# Patient Record
Sex: FEMALE | Race: WHITE | NOT HISPANIC OR LATINO | Employment: STUDENT | URBAN - METROPOLITAN AREA
[De-identification: names, ages, dates, MRNs, and addresses within clinical notes are randomized per-mention and may not be internally consistent; named-entity substitution may affect disease eponyms.]

---

## 2018-05-14 ENCOUNTER — RECORDS - HEALTHEAST (OUTPATIENT)
Dept: LAB | Facility: CLINIC | Age: 18
End: 2018-05-14

## 2018-05-14 LAB
T4 FREE SERPL-MCNC: 1 NG/DL (ref 0.7–1.8)
TSH SERPL DL<=0.005 MIU/L-ACNC: 0.82 UIU/ML (ref 0.3–5)

## 2018-05-15 LAB
25(OH)D3 SERPL-MCNC: 32.1 NG/ML (ref 30–80)
C TRACH DNA SPEC QL PROBE+SIG AMP: NEGATIVE
N GONORRHOEA DNA SPEC QL NAA+PROBE: NEGATIVE

## 2021-10-12 ENCOUNTER — LAB REQUISITION (OUTPATIENT)
Dept: LAB | Facility: CLINIC | Age: 21
End: 2021-10-12

## 2021-10-12 DIAGNOSIS — R06.02 SHORTNESS OF BREATH: ICD-10-CM

## 2021-10-12 LAB
T3FREE SERPL-MCNC: 3 PG/ML (ref 1.6–3.9)
T4 FREE SERPL-MCNC: 0.89 NG/DL (ref 0.7–1.8)
TSH SERPL DL<=0.005 MIU/L-ACNC: 0.87 UIU/ML (ref 0.3–5)

## 2021-10-12 PROCEDURE — 84439 ASSAY OF FREE THYROXINE: CPT | Performed by: FAMILY MEDICINE

## 2021-10-12 PROCEDURE — 86003 ALLG SPEC IGE CRUDE XTRC EA: CPT | Performed by: FAMILY MEDICINE

## 2021-10-12 PROCEDURE — 84443 ASSAY THYROID STIM HORMONE: CPT | Performed by: FAMILY MEDICINE

## 2021-10-12 PROCEDURE — 84481 FREE ASSAY (FT-3): CPT | Performed by: FAMILY MEDICINE

## 2021-10-14 LAB
A ALTERNATA IGE QN: <0.1 KU(A)/L
A FUMIGATUS IGE QN: <0.1 KU(A)/L
ALMOND IGE QN: <0.1 KU(A)/L
BERMUDA GRASS IGE QN: <0.1 KU(A)/L
C HERBARUM IGE QN: <0.1 KU(A)/L
CASHEW NUT IGE QN: <0.1 KU(A)/L
CAT DANDER IGG QN: <0.1 KU(A)/L
CEDAR IGE QN: <0.1 KU(A)/L
CODFISH IGE QN: <0.1 KU(A)/L
COMMON RAGWEED IGE QN: <0.1 KU(A)/L
COTTONWOOD IGE QN: <0.1 KU(A)/L
COW MILK IGE QN: <0.1 KU(A)/L
D FARINAE IGE QN: <0.1 KU(A)/L
D PTERONYSS IGE QN: 0.11 KU(A)/L
DOG DANDER+EPITH IGE QN: <0.1 KU(A)/L
EGG WHITE IGE QN: <0.1 KU(A)/L
HAZELNUT IGE QN: <0.1 KU(A)/L
IGE SERPL-ACNC: 14 KU/L (ref 0–114)
IGE SERPL-ACNC: 14 KU/L (ref 0–114)
MAPLE IGE QN: <0.1 KU(A)/L
MARSH ELDER IGE QN: <0.1 KU(A)/L
MOUSE URINE PROT IGE QN: <0.1 KU(A)/L
NETTLE IGE QN: <0.1 KU(A)/L
P NOTATUM IGE QN: <0.1 KU(A)/L
PEANUT IGE QN: <0.1 KU(A)/L
ROACH IGE QN: <0.1 KU(A)/L
SALMON IGE QN: <0.1 KU(A)/L
SALTWORT IGE QN: <0.1 KU(A)/L
SCALLOP IGE QN: <0.1 KU(A)/L
SESAME SEED IGE QN: <0.1 KU(A)/L
SHRIMP IGE QN: <0.1 KU(A)/L
SILVER BIRCH IGE QN: <0.1 KU(A)/L
SOYBEAN IGE QN: <0.1 KU(A)/L
TIMOTHY IGE QN: <0.1 KU(A)/L
TUNA IGE QN: <0.1 KU(A)/L
WALNUT IGE QN: <0.1 KU(A)/L
WHEAT IGE QN: <0.1 KU(A)/L
WHITE ASH IGE QN: <0.1 KU(A)/L
WHITE ELM IGE QN: <0.1 KU(A)/L
WHITE MULBERRY IGE QN: <0.1 KU(A)/L
WHITE OAK IGE QN: <0.1 KU(A)/L

## 2021-11-30 ENCOUNTER — PATIENT OUTREACH (OUTPATIENT)
Dept: CARE COORDINATION | Facility: CLINIC | Age: 21
End: 2021-11-30

## 2021-11-30 DIAGNOSIS — Z65.9 PSYCHOSOCIAL PROBLEM: Primary | ICD-10-CM

## 2021-11-30 NOTE — PROGRESS NOTES
Clinic Care Coordination Contact  [facility]/Voicemail       Clinical Data: Care Coordinator Outreach  Outreach attempted x 1.  Left message on patient's voicemail with call back information and requested return call.  Plan:  Care Coordinator will try to reach patient again in 1-2 business days.    Danielle Rendon [facility]  Clinic Care Coordinator  [facility]   [phone]

## 2021-12-20 ENCOUNTER — PATIENT OUTREACH (OUTPATIENT)
Dept: CARE COORDINATION | Facility: CLINIC | Age: 21
End: 2021-12-20

## 2021-12-20 NOTE — PROGRESS NOTES
Clinic Care Coordination Contact  [facility]/Voicemail       Clinical Data: Care Coordinator Outreach  Outreach attempted x 2.  Left message on patient's voicemail with call back information and requested return call.  Plan: Care Coordinator will try to reach patient again in 3-5 business days.    Danielle Rendon [facility]  Clinic Care Coordinator  [facility]   [phone]

## 2022-01-19 ENCOUNTER — PATIENT OUTREACH (OUTPATIENT)
Dept: CARE COORDINATION | Facility: CLINIC | Age: 22
End: 2022-01-19

## 2022-01-19 NOTE — PROGRESS NOTES
Clinic Care Coordination Contact  [facility]/Voicemail       Clinical Data: Care Coordinator Outreach  Outreach attempted x 2.  Left message on patient's voicemail with call back information and requested return call.  Plan: Care Coordinator will send care coordination introduction letter with care coordinator contact information and explanation of care coordination services via mail. Care Coordinator will try to reach patient again in 1 month.    MARLENI Nick  Social Work Care Coordinator - [facility]  Care Coordination  [email or web address]  Cell Phone: [phone]  Gender pronouns: she/her  Employed by [employer]

## 2022-01-19 NOTE — LETTER
[facility] CARE COORDINATION    January 19, 2022    Juana [place]  [address] [place] [address]      Dear Juana,    I am a clinic care coordinator who works with Pavel Land MD at [facility]. I have been trying to reach you recently to introduce Clinic Care Coordination and to see if there was anything I could assist you with.  Below is a description of clinic care coordination and how I can further assist you.      The clinic care coordination team is made up of a team of care members and  who understand the health care system. The goal of clinic care coordination is to help you manage your health and improve access to the health care system in the most efficient manner. The team can assist you in meeting your health care goals by providing education, coordinating services, strengthening the communication among your providers and supporting you with any resource needs.    Please feel free to contact me at [phone] with any questions or concerns. We are focused on providing you with the highest-quality healthcare experience possible and that all starts with you.     Sincerely,     MARLENI Nick  Social Work Care Coordinator - [facility]  Care Coordination  [email or web address]  Cell Phone: [phone]  Gender pronouns: she/her  Employed by [employer]

## 2022-03-18 ENCOUNTER — PATIENT OUTREACH (OUTPATIENT)
Dept: CARE COORDINATION | Facility: CLINIC | Age: 22
End: 2022-03-18

## 2022-03-18 NOTE — PROGRESS NOTES
Clinic Care Coordination Contact  Care Team Conversations    ACE MORRIS performed chart review and noted the pt has not returned contact with the ACE MORRIS. ACE MORRIS has had no success with outreaching to the patient. ACE MORRIS will do no further outreaches at this time and will wait for a new referral to come in before outreaching to the pt.     MARLENI Nick  Social Work Care Coordinator - [facility]  Care Coordination  [email or web address]  Cell Phone: [phone]  Gender pronouns: she/her  Employed by [employer]

## 2022-12-28 ENCOUNTER — LAB REQUISITION (OUTPATIENT)
Dept: LAB | Facility: CLINIC | Age: 22
End: 2022-12-28

## 2022-12-28 DIAGNOSIS — R19.7 DIARRHEA, UNSPECIFIED: ICD-10-CM

## 2022-12-28 LAB
C DIFF TOX B STL QL: NEGATIVE
WBC STL QL MICRO: NORMAL

## 2022-12-28 PROCEDURE — 87209 SMEAR COMPLEX STAIN: CPT | Performed by: FAMILY MEDICINE

## 2022-12-28 PROCEDURE — 83516 IMMUNOASSAY NONANTIBODY: CPT | Performed by: FAMILY MEDICINE

## 2022-12-28 PROCEDURE — 86003 ALLG SPEC IGE CRUDE XTRC EA: CPT | Performed by: FAMILY MEDICINE

## 2022-12-28 PROCEDURE — 87493 C DIFF AMPLIFIED PROBE: CPT | Performed by: FAMILY MEDICINE

## 2022-12-28 PROCEDURE — 87506 IADNA-DNA/RNA PROBE TQ 6-11: CPT | Performed by: FAMILY MEDICINE

## 2022-12-28 PROCEDURE — 89055 LEUKOCYTE ASSESSMENT FECAL: CPT | Performed by: FAMILY MEDICINE

## 2022-12-28 PROCEDURE — 82784 ASSAY IGA/IGD/IGG/IGM EACH: CPT | Performed by: FAMILY MEDICINE

## 2022-12-29 LAB
ALMOND IGE QN: <0.1 KU(A)/L
C COLI+JEJUNI+LARI FUSA STL QL NAA+PROBE: NOT DETECTED
CASHEW NUT IGE QN: <0.1 KU(A)/L
CODFISH IGE QN: <0.1 KU(A)/L
COW MILK IGE QN: <0.1 KU(A)/L
EC STX1 GENE STL QL NAA+PROBE: NOT DETECTED
EC STX2 GENE STL QL NAA+PROBE: NOT DETECTED
EGG WHITE IGE QN: <0.1 KU(A)/L
GLIADIN IGA SER-ACNC: 1.2 U/ML
GLIADIN IGG SER-ACNC: 1.7 U/ML
HAZELNUT IGE QN: <0.1 KU(A)/L
IGA SERPL-MCNC: 123 MG/DL (ref 84–499)
IGE SERPL-ACNC: 9 KU/L (ref 0–114)
NOROV GI+II ORF1-ORF2 JNC STL QL NAA+PR: NOT DETECTED
O+P STL MICRO: NEGATIVE
PEANUT IGE QN: <0.1 KU(A)/L
RVA NSP5 STL QL NAA+PROBE: NOT DETECTED
SALMON IGE QN: <0.1 KU(A)/L
SALMONELLA SP RPOD STL QL NAA+PROBE: NOT DETECTED
SCALLOP IGE QN: <0.1 KU(A)/L
SESAME SEED IGE QN: <0.1 KU(A)/L
SHIGELLA SP+EIEC IPAH STL QL NAA+PROBE: NOT DETECTED
SHRIMP IGE QN: <0.1 KU(A)/L
SOYBEAN IGE QN: <0.1 KU(A)/L
TTG IGA SER-ACNC: 0.2 U/ML
TTG IGG SER-ACNC: 0.9 U/ML
TUNA IGE QN: <0.1 KU(A)/L
V CHOL+PARA RFBL+TRKH+TNAA STL QL NAA+PR: NOT DETECTED
WALNUT IGE QN: <0.1 KU(A)/L
WHEAT IGE QN: <0.1 KU(A)/L
Y ENTERO RECN STL QL NAA+PROBE: NOT DETECTED

## 2023-09-01 ENCOUNTER — LAB REQUISITION (OUTPATIENT)
Dept: LAB | Facility: CLINIC | Age: 23
End: 2023-09-01

## 2023-09-01 DIAGNOSIS — Z01.419 ENCOUNTER FOR GYNECOLOGICAL EXAMINATION (GENERAL) (ROUTINE) WITHOUT ABNORMAL FINDINGS: ICD-10-CM

## 2023-09-01 PROCEDURE — G0124 SCREEN C/V THIN LAYER BY MD: HCPCS | Performed by: PATHOLOGY

## 2023-09-01 PROCEDURE — G0145 SCR C/V CYTO,THINLAYER,RESCR: HCPCS | Performed by: FAMILY MEDICINE

## 2023-09-03 ENCOUNTER — OFFICE VISIT (OUTPATIENT)
Dept: URGENT CARE | Facility: URGENT CARE | Age: 23
End: 2023-09-03
Payer: COMMERCIAL

## 2023-09-03 VITALS
HEART RATE: 59 BPM | BODY MASS INDEX: 25.69 KG/M2 | SYSTOLIC BLOOD PRESSURE: 105 MMHG | DIASTOLIC BLOOD PRESSURE: 70 MMHG | OXYGEN SATURATION: 99 % | HEIGHT: 63 IN | TEMPERATURE: 98.6 F | WEIGHT: 145 LBS

## 2023-09-03 DIAGNOSIS — L08.9 INFECTION OF LEFT PIERCED EAR: Primary | ICD-10-CM

## 2023-09-03 DIAGNOSIS — S01.332A INFECTION OF LEFT PIERCED EAR: Primary | ICD-10-CM

## 2023-09-03 PROCEDURE — 99203 OFFICE O/P NEW LOW 30 MIN: CPT | Performed by: INTERNAL MEDICINE

## 2023-09-03 RX ORDER — LAMOTRIGINE 200 MG/1
TABLET ORAL
COMMUNITY

## 2023-09-03 RX ORDER — CEPHALEXIN 500 MG/1
500 CAPSULE ORAL 3 TIMES DAILY
Qty: 15 CAPSULE | Refills: 0 | Status: SHIPPED | OUTPATIENT
Start: 2023-09-03 | End: 2023-09-08

## 2023-09-03 RX ORDER — MUPIROCIN 20 MG/G
OINTMENT TOPICAL 3 TIMES DAILY
Qty: 30 G | Refills: 0 | Status: SHIPPED | OUTPATIENT
Start: 2023-09-03 | End: 2023-09-08

## 2023-09-03 RX ORDER — ESCITALOPRAM OXALATE 10 MG/1
TABLET ORAL
COMMUNITY

## 2023-09-03 NOTE — PROGRESS NOTES
"ASSESSMENT AND PLAN:      ICD-10-CM    1. Infection of left pierced ear  S01.332A cephALEXin (KEFLEX) 500 MG capsule    L08.9 mupirocin (BACTROBAN) 2 % external ointment        Discussed wound care 2 x day     Martina Kwon MD  [facility] URGENT CARE    Subjective     Juana Murillo is a 23 year old who presents for Patient presents with:  Urgent Care: Pt in clinic to have eval for left ear redness.    a new patient of [facility].        Onset of symptoms was 3 - 4 day(s) ago.  Course of illness is worsening.    Location: left ear lobe  Context: pierced few years ago.  Stopped wearing earring for few months & difficulty getting earring back in 10 days ago.  Friend helped it push it through.  Current and Associated symptoms: redness, warmth, swelling, pain, and drainage  Treatment measures tried include: saline solution, rinsing in shower  Relief from treatment: minor        Objective    /70   Pulse 59   Temp 98.6  F (37  C) (Temporal)   Ht 1.6 m (5' 3\")   Wt 65.8 kg (145 lb)   LMP 08/23/2023   SpO2 99%   BMI 25.69 kg/m    Physical Exam  Vitals reviewed.   Constitutional:       Appearance: Normal appearance. She is not ill-appearing.   Lymphadenopathy:      Cervical: Cervical adenopathy (Left auricle LN) present.   Skin:     Comments: Ear lobe red swollen, discharge with crusting at ear ring site   Neurological:      Mental Status: She is alert.                  "

## 2023-09-05 ENCOUNTER — TRANSCRIBE ORDERS (OUTPATIENT)
Dept: OTHER | Age: 23
End: 2023-09-05

## 2023-09-05 DIAGNOSIS — M25.561 PAIN IN RIGHT KNEE: Primary | ICD-10-CM

## 2023-09-08 LAB
BKR LAB AP GYN ADEQUACY: ABNORMAL
BKR LAB AP GYN INTERPRETATION: ABNORMAL
BKR LAB AP HPV REFLEX: ABNORMAL
BKR LAB AP LMP: ABNORMAL
BKR LAB AP PREVIOUS ABNORMAL: ABNORMAL
PATH REPORT.COMMENTS IMP SPEC: ABNORMAL
PATH REPORT.COMMENTS IMP SPEC: ABNORMAL
PATH REPORT.RELEVANT HX SPEC: ABNORMAL

## 2023-12-05 ENCOUNTER — LAB REQUISITION (OUTPATIENT)
Dept: LAB | Facility: CLINIC | Age: 23
End: 2023-12-05

## 2023-12-05 DIAGNOSIS — R06.02 SHORTNESS OF BREATH: ICD-10-CM

## 2023-12-05 LAB
ANION GAP SERPL CALCULATED.3IONS-SCNC: 12 MMOL/L (ref 7–15)
BUN SERPL-MCNC: 9.9 MG/DL (ref 6–20)
CALCIUM SERPL-MCNC: 9.5 MG/DL (ref 8.6–10)
CHLORIDE SERPL-SCNC: 104 MMOL/L (ref 98–107)
CREAT SERPL-MCNC: 0.79 MG/DL (ref 0.51–0.95)
DEPRECATED HCO3 PLAS-SCNC: 21 MMOL/L (ref 22–29)
EGFRCR SERPLBLD CKD-EPI 2021: >90 ML/MIN/1.73M2
GLUCOSE SERPL-MCNC: 77 MG/DL (ref 70–99)
POTASSIUM SERPL-SCNC: 4.1 MMOL/L (ref 3.4–5.3)
SODIUM SERPL-SCNC: 137 MMOL/L (ref 135–145)
TSH SERPL DL<=0.005 MIU/L-ACNC: 1.17 UIU/ML (ref 0.3–4.2)

## 2023-12-05 PROCEDURE — 80048 BASIC METABOLIC PNL TOTAL CA: CPT | Performed by: FAMILY MEDICINE

## 2023-12-05 PROCEDURE — 84443 ASSAY THYROID STIM HORMONE: CPT | Performed by: FAMILY MEDICINE

## 2023-12-05 PROCEDURE — 86003 ALLG SPEC IGE CRUDE XTRC EA: CPT | Performed by: FAMILY MEDICINE

## 2023-12-06 LAB

## 2024-08-28 ENCOUNTER — OFFICE VISIT (OUTPATIENT)
Dept: URGENT CARE | Facility: URGENT CARE | Age: 24
End: 2024-08-28
Payer: COMMERCIAL

## 2024-08-28 VITALS
BODY MASS INDEX: 28.7 KG/M2 | SYSTOLIC BLOOD PRESSURE: 112 MMHG | HEIGHT: 63 IN | TEMPERATURE: 98 F | WEIGHT: 162 LBS | HEART RATE: 54 BPM | DIASTOLIC BLOOD PRESSURE: 75 MMHG | RESPIRATION RATE: 16 BRPM | OXYGEN SATURATION: 98 %

## 2024-08-28 DIAGNOSIS — L08.9 SOFT TISSUE INFECTION: ICD-10-CM

## 2024-08-28 DIAGNOSIS — S01.332A COMPLICATION OF LEFT EAR PIERCING, INITIAL ENCOUNTER: Primary | ICD-10-CM

## 2024-08-28 PROCEDURE — 99203 OFFICE O/P NEW LOW 30 MIN: CPT | Performed by: NURSE PRACTITIONER

## 2024-08-28 RX ORDER — CEPHALEXIN 500 MG/1
500 CAPSULE ORAL 3 TIMES DAILY
Qty: 30 CAPSULE | Refills: 0 | Status: SHIPPED | OUTPATIENT
Start: 2024-08-28 | End: 2024-09-07

## 2024-08-28 RX ORDER — MUPIROCIN 20 MG/G
OINTMENT TOPICAL 3 TIMES DAILY
Qty: 22 G | Refills: 0 | Status: SHIPPED | OUTPATIENT
Start: 2024-08-28 | End: 2024-09-07

## 2024-08-28 RX ORDER — DROSPIRENONE AND ETHINYL ESTRADIOL 0.02-3(28)
1 KIT ORAL DAILY
COMMUNITY

## 2024-08-28 NOTE — PROGRESS NOTES
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  This is a newer piercing in the last months.  Has not been red tender swollen with a keloid pus pocket.  Her dad is a dentist and lanced it twice.  She has slight pain at the site.  No fever sweats chills nausea vomiting rapid worsening.  Has been cleaning it with saline solution.  Tetanus up-to-date 5/14/2018.    No past medical history on file.  Current Outpatient Medications   Medication Sig Dispense Refill    cephALEXin (KEFLEX) 500 MG capsule Take 1 capsule (500 mg) by mouth 3 times daily for 10 days. 30 capsule 0    drospirenone-ethinyl estradiol (DEBRA) 3-0.02 MG tablet Take 1 tablet by mouth daily.      mupirocin (BACTROBAN) 2 % external ointment Apply topically 3 times daily for 10 days. 22 g 0     No current facility-administered medications for this visit.     Social History     Tobacco Use    Smoking status: Never    Smokeless tobacco: Never   Substance Use Topics    Alcohol use: Not on file     No Known Allergies    Review of Systems  All systems negative except for those listed above in HPI.      OBJECTIVE:   /75   Pulse 54   Temp 98  F (36.7  C) (Temporal)   Resp 16   Ht 1.6 m (5' 3\")   Wt 73.5 kg (162 lb)   SpO2 98%   BMI 28.70 kg/m    Physical Exam  Vitals reviewed.   Constitutional:       Appearance: Normal appearance.   HENT:      Head: Normocephalic and atraumatic.      Right Ear: Ear canal normal.      Left Ear: Ear canal normal.      Nose: Nose normal.      Mouth/Throat:      Comments: Bilateral second ear piercing superficial soft tissue infection with mild erythema edema tenderness and yellow honey colored crust.  There is no fluctuance.  Cardiovascular:      Rate and Rhythm: Normal rate.   Pulmonary:      Effort: Pulmonary effort is normal. "   Musculoskeletal:      Cervical back: Normal range of motion and neck supple.   Skin:     General: Skin is warm and dry.   Neurological:      General: No focal deficit present.      Mental Status: She is alert and oriented to person, place, and time.   Psychiatric:         Mood and Affect: Mood normal.         Behavior: Behavior normal.       ASSESSMENT:    ICD-10-CM    1. Complication of left ear piercing, initial encounter  S01.332A cephALEXin (KEFLEX) 500 MG capsule     mupirocin (BACTROBAN) 2 % external ointment      2. Soft tissue infection  L08.9 cephALEXin (KEFLEX) 500 MG capsule     mupirocin (BACTROBAN) 2 % external ointment          PLAN:     Start Keflex and mupirocin ointment  Epsom salt warm soaks  Should see improvement in 2 to 3 days  If infection worsens would need to remove the earring for the next several months  Reevaluation if rapid worsening redness pus fevers pain  Nothing to I&D or culture here today    Follow up with primary care provider with any problems, questions or concerns or if symptoms worsen or fail to improve. Patient agreed to plan and verbalized understanding.    LASHA [facility] CARE [place]

## 2024-09-09 ENCOUNTER — OFFICE VISIT (OUTPATIENT)
Dept: URGENT CARE | Facility: URGENT CARE | Age: 24
End: 2024-09-09
Payer: COMMERCIAL

## 2024-09-09 VITALS
TEMPERATURE: 98.4 F | SYSTOLIC BLOOD PRESSURE: 120 MMHG | OXYGEN SATURATION: 99 % | RESPIRATION RATE: 16 BRPM | DIASTOLIC BLOOD PRESSURE: 81 MMHG | HEART RATE: 81 BPM

## 2024-09-09 DIAGNOSIS — H60.392 INFECTION OF SKIN OF LEFT EAR LOBE: Primary | ICD-10-CM

## 2024-09-09 PROCEDURE — 87070 CULTURE OTHR SPECIMN AEROBIC: CPT | Performed by: PHYSICIAN ASSISTANT

## 2024-09-09 PROCEDURE — 87077 CULTURE AEROBIC IDENTIFY: CPT | Performed by: PHYSICIAN ASSISTANT

## 2024-09-09 PROCEDURE — 99213 OFFICE O/P EST LOW 20 MIN: CPT | Performed by: PHYSICIAN ASSISTANT

## 2024-09-09 RX ORDER — SULFAMETHOXAZOLE/TRIMETHOPRIM 800-160 MG
1 TABLET ORAL 2 TIMES DAILY
Qty: 14 TABLET | Refills: 0 | Status: SHIPPED | OUTPATIENT
Start: 2024-09-09 | End: 2024-09-16

## 2024-09-09 NOTE — PROGRESS NOTES
Infection of skin of left ear lobe  - sulfamethoxazole-trimethoprim (BACTRIM DS) 800-160 MG tablet; Take 1 tablet by mouth 2 times daily for 7 days.  - Abscess Aerobic Bacterial Culture Routine Without Gram Stain  - Adult Dermatology [facility] Referral; Future    I cleaned the area with a isopropyl alcohol and poked a small hole in the top of the abscess. A very small amount of purulent discharge was excreted and a wound culture sample was taken. Patient was then asked to apply pressure for 5 min and the bleeding stopped. Patient will be switched from Keflex to Bactrim while we await the culture results. Patient educated on wound care instructions and asked to follow up with dermatology in 1-2 weeks if no improvement in her symptoms. It may be that the patient has formed a small keloid that is continuing to become infected. This may need removal.     Mynor Delgado PA-C  [facility] URGENT CARE    Subjective   24 year old who presents to clinic today for the following health issues:    Urgent Care, Ear Problem, and Insect Bites       HPI     Patient's left ear infection began about 1 month ago. Dad is a vet and drained a small abscess with a sterile needle. It was drained a second time and the patient was then placed on Keflex and mupirocin. Last dose of keflex is today. Patient wants to make sure that infection has cleared. Patient also has a bump on her left hip that is spreading and red.     Patient denies any fever, chills, fatigue, or body aches.     Review of Systems   Review of Systems   See HPI    Objective    Temp: 98.4  F (36.9  C) Temp src: Temporal BP: 120/81 Pulse: 81   Resp: 16 SpO2: 99 %       Physical Exam   Physical Exam  Constitutional:       General: She is not in acute distress.     Appearance: Normal appearance. She is normal weight. She is not ill-appearing, toxic-appearing or diaphoretic.   HENT:      Head: Normocephalic and atraumatic.      Ears:        Comments: On the posterior  aspect of the left ear lobe, there is a very small abscess present right near the opening of the piercing. There is slight surround erythema. No active drainage.    Cardiovascular:      Rate and Rhythm: Normal rate.      Pulses: Normal pulses.   Pulmonary:      Effort: Pulmonary effort is normal. No respiratory distress.   Neurological:      General: No focal deficit present.      Mental Status: She is alert and oriented to person, place, and time. Mental status is at baseline.      Gait: Gait normal.   Psychiatric:         Mood and Affect: Mood normal.         Behavior: Behavior normal.         Thought Content: Thought content normal.         Judgment: Judgment normal.          No results found for this or any previous visit (from the past 24 hour(s)).

## 2024-09-12 LAB — BACTERIA ABSC ANAEROBE+AEROBE CULT: ABNORMAL

## 2024-09-16 ENCOUNTER — OFFICE VISIT (OUTPATIENT)
Dept: DERMATOLOGY | Facility: CLINIC | Age: 24
End: 2024-09-16
Payer: COMMERCIAL

## 2024-09-16 DIAGNOSIS — L23.0 CONTACT DERMATITIS DUE TO METALS, UNSPECIFIED CONTACT DERMATITIS TYPE: Primary | ICD-10-CM

## 2024-09-16 PROCEDURE — 99203 OFFICE O/P NEW LOW 30 MIN: CPT | Mod: GC | Performed by: DERMATOLOGY

## 2024-09-16 RX ORDER — TRIAMCINOLONE ACETONIDE 1 MG/G
OINTMENT TOPICAL
Qty: 15 G | Refills: 1 | Status: SHIPPED | OUTPATIENT
Start: 2024-09-16

## 2024-09-16 RX ORDER — PROPRANOLOL HYDROCHLORIDE 20 MG/1
TABLET ORAL
COMMUNITY
Start: 2024-09-04

## 2024-09-16 NOTE — LETTER
9/16/2024       RE: Juana Murillo  [address]  [place] [address]     Dear Colleague,    Thank you for referring your patient, Juana Murillo, to the [facility] DERMATOLOGY CL[facility]IC [place] at [facility]. Please see a copy of my visit note below.    [facility] Dermatology Note  Encounter Date: Sep 16, 2024  Office Visit     Dermatology Problem List:  # Papule L posterior ear lobe  Ddx: allergic vs irritant contact dermatitis with possible superimposed infection  - Bactrim course  - Triamcinolone 0.1% ointment  S/p mupirocin, Keflex  ____________________________________________    Assessment & Plan:   # Papule L posterior ear lobe with serosanguinous drainage.   Culture growing 1+ Staph epi. Symptoms not improving despite 10 day course of mupirocin, Keflex and bactrim. Possible that this was an abscess/other skin infection in area of prior piercing, however we would expect this too improve with antibiotics. Culture results also don't support infection, though patient had been on 10 days of antibiotics when culture was done. At this time favor possible overyling allergic vs irritant contact dermatitis to the site from the metal earring. Discussed that in the future the area could develop a keloid, though currently these symptoms are not consistent with that today.   - Go to piercing establishment to remove earring back  - Finish off bactrim course as previously prescribed  - Start triamcinolone 0.1% ointment BID PRN       Procedures Performed:   None      Follow-up: PRN pending symptom evolution    Staff: Dr. Jomar Alcala MD,  Dermatology Resident, PGY2    I, Barbara Hadley MD, saw this patient with the resident and agree with the resident s findings and plan of care as documented in the resident s note.    ____________________________________________    CC: Derm Problem (Check left earlob: triple piercing  infection. Keeps filling with pus and blood)    HPI:  Ms. Juana Murillo is a(n) 24 year old female who presents today as a new patient for ear lobe infection.  Patient reports that she got her ears pierced in June. For the first month she had no issues. About 7 weeks ago she started having redness, mild tenderness and serosanguinous drainage. At the end of August she was started on Keflex and mupirocin. This helped for the first 7 days, but last 3 days worsened. Patient saw urgent care 9/9/24. L ear lobe drain wound culture sent. Switched from Keflex to bactrim. Three days left of the bactrim today and still hasn't noticed improvement.    Patient is otherwise feeling well, without additional skin concerns.    Labs Reviewed:  9/9/24 Bacterial culture:  1+ staph epidermidis    Physical Exam:  Vitals: There were no vitals taken for this visit.  SKIN: Focused examination of ears was performed.  - L posterior ear lobe with fluctuant red papule just superior to piercing  - R ear lobe with mild erythema  - No other lesions of concern on areas examined.     Medications:  Current Outpatient Medications   Medication Sig Dispense Refill     drospirenone-ethinyl estradiol (DEBRA) 3-0.02 MG tablet Take 1 tablet by mouth daily.       escitalopram (LEXAPRO) 10 MG tablet 1 tablet Orally Once a day       lamoTRIgine (LAMICTAL) 200 MG tablet 1 tablet Orally Once a day       sulfamethoxazole-trimethoprim (BACTRIM DS) 800-160 MG tablet Take 1 tablet by mouth 2 times daily for 7 days. 14 tablet 0     propranolol (INDERAL) 20 MG tablet Take one (1) tablet by mouth twice a day, as needed (Patient not taking: Reported on 9/16/2024)       No current facility-administered medications for this visit.      Past Medical History:   There is no problem list on file for this patient.    No past medical history on file.    CC Referred Self, MD  No address on file on close of this encounter.      Again, thank you for allowing me to participate in  the care of your patient.      Sincerely,    Barbara Hadley MD

## 2024-09-16 NOTE — PROGRESS NOTES
[facility] Dermatology Note  Encounter Date: Sep 16, 2024  Office Visit     Dermatology Problem List:  # Papule L posterior ear lobe  Ddx: allergic vs irritant contact dermatitis with possible superimposed infection  - Bactrim course  - Triamcinolone 0.1% ointment  S/p mupirocin, Keflex  ____________________________________________    Assessment & Plan:   # Papule L posterior ear lobe with serosanguinous drainage.   Culture growing 1+ Staph epi. Symptoms not improving despite 10 day course of mupirocin, Keflex and bactrim. Possible that this was an abscess/other skin infection in area of prior piercing, however we would expect this too improve with antibiotics. Culture results also don't support infection, though patient had been on 10 days of antibiotics when culture was done. At this time favor possible overyling allergic vs irritant contact dermatitis to the site from the metal earring. Discussed that in the future the area could develop a keloid, though currently these symptoms are not consistent with that today.   - Go to piercing establishment to remove earring back  - Finish off bactrim course as previously prescribed  - Start triamcinolone 0.1% ointment BID PRN       Procedures Performed:   None      Follow-up: PRN pending symptom evolution    Staff: Dr. Jomar Alcala MD,  Dermatology Resident, PGY2    I, Barbara Hadley MD, saw this patient with the resident and agree with the resident s findings and plan of care as documented in the resident s note.    ____________________________________________    CC: Derm Problem (Check left earlob: triple piercing infection. Keeps filling with pus and blood)    HPI:  Ms. Juana Murillo is a(n) 24 year old female who presents today as a new patient for ear lobe infection.  Patient reports that she got her ears pierced in June. For the first month she had no issues. About 7 weeks ago she started having redness, mild  tenderness and serosanguinous drainage. At the end of August she was started on Keflex and mupirocin. This helped for the first 7 days, but last 3 days worsened. Patient saw urgent care 9/9/24. L ear lobe drain wound culture sent. Switched from Keflex to bactrim. Three days left of the bactrim today and still hasn't noticed improvement.    Patient is otherwise feeling well, without additional skin concerns.    Labs Reviewed:  9/9/24 Bacterial culture:  1+ staph epidermidis    Physical Exam:  Vitals: There were no vitals taken for this visit.  SKIN: Focused examination of ears was performed.  - L posterior ear lobe with fluctuant red papule just superior to piercing  - R ear lobe with mild erythema  - No other lesions of concern on areas examined.     Medications:  Current Outpatient Medications   Medication Sig Dispense Refill    drospirenone-ethinyl estradiol (DEBRA) 3-0.02 MG tablet Take 1 tablet by mouth daily.      escitalopram (LEXAPRO) 10 MG tablet 1 tablet Orally Once a day      lamoTRIgine (LAMICTAL) 200 MG tablet 1 tablet Orally Once a day      sulfamethoxazole-trimethoprim (BACTRIM DS) 800-160 MG tablet Take 1 tablet by mouth 2 times daily for 7 days. 14 tablet 0    propranolol (INDERAL) 20 MG tablet Take one (1) tablet by mouth twice a day, as needed (Patient not taking: Reported on 9/16/2024)       No current facility-administered medications for this visit.      Past Medical History:   There is no problem list on file for this patient.    No past medical history on file.    CC Referred Self, MD  No address on file on close of this encounter.

## 2024-09-29 ENCOUNTER — HEALTH MAINTENANCE LETTER (OUTPATIENT)
Age: 24
End: 2024-09-29

## 2025-02-07 ENCOUNTER — LAB REQUISITION (OUTPATIENT)
Dept: LAB | Facility: CLINIC | Age: 25
End: 2025-02-07

## 2025-02-07 DIAGNOSIS — Z01.419 ENCOUNTER FOR GYNECOLOGICAL EXAMINATION (GENERAL) (ROUTINE) WITHOUT ABNORMAL FINDINGS: ICD-10-CM

## 2025-02-07 PROCEDURE — G0145 SCR C/V CYTO,THINLAYER,RESCR: HCPCS | Performed by: FAMILY MEDICINE

## 2025-02-11 LAB
BKR LAB AP GYN ADEQUACY: NORMAL
BKR LAB AP GYN INTERPRETATION: NORMAL
BKR LAB AP HPV REFLEX: NORMAL
BKR LAB AP LMP: NORMAL
BKR LAB AP PREVIOUS ABNL DX: NORMAL
BKR LAB AP PREVIOUS ABNORMAL: NORMAL
PATH REPORT.COMMENTS IMP SPEC: NORMAL
PATH REPORT.COMMENTS IMP SPEC: NORMAL
PATH REPORT.RELEVANT HX SPEC: NORMAL